# Patient Record
Sex: FEMALE | URBAN - METROPOLITAN AREA
[De-identification: names, ages, dates, MRNs, and addresses within clinical notes are randomized per-mention and may not be internally consistent; named-entity substitution may affect disease eponyms.]

---

## 2022-10-26 ENCOUNTER — APPOINTMENT (OUTPATIENT)
Dept: URBAN - METROPOLITAN AREA CLINIC 199 | Age: 87
Setting detail: DERMATOLOGY
End: 2022-10-26

## 2022-10-26 DIAGNOSIS — D17 BENIGN LIPOMATOUS NEOPLASM: ICD-10-CM

## 2022-10-26 DIAGNOSIS — L29.8 OTHER PRURITUS: ICD-10-CM

## 2022-10-26 DIAGNOSIS — R20.8 OTHER DISTURBANCES OF SKIN SENSATION: ICD-10-CM

## 2022-10-26 PROBLEM — D17.1 BENIGN LIPOMATOUS NEOPLASM OF SKIN AND SUBCUTANEOUS TISSUE OF TRUNK: Status: ACTIVE | Noted: 2022-10-26

## 2022-10-26 PROCEDURE — 99204 OFFICE O/P NEW MOD 45 MIN: CPT

## 2022-10-26 PROCEDURE — OTHER PRESCRIPTION: OTHER

## 2022-10-26 PROCEDURE — OTHER COUNSELING: OTHER

## 2022-10-26 RX ORDER — TRIAMCINOLONE ACETONIDE 1 MG/G
CREAM TOPICAL BID
Qty: 453.6 | Refills: 3 | Status: ERX | COMMUNITY
Start: 2022-10-26

## 2022-10-26 ASSESSMENT — LOCATION SIMPLE DESCRIPTION DERM
LOCATION SIMPLE: LEFT UPPER BACK
LOCATION SIMPLE: RIGHT UPPER BACK
LOCATION SIMPLE: RIGHT SHOULDER

## 2022-10-26 ASSESSMENT — LOCATION ZONE DERM
LOCATION ZONE: TRUNK
LOCATION ZONE: ARM

## 2022-10-26 ASSESSMENT — LOCATION DETAILED DESCRIPTION DERM
LOCATION DETAILED: RIGHT POSTERIOR SHOULDER
LOCATION DETAILED: LEFT SUPERIOR UPPER BACK
LOCATION DETAILED: RIGHT SUPERIOR MEDIAL UPPER BACK

## 2022-10-26 NOTE — PROCEDURE: COUNSELING
Patient Specific Counseling (Will Not Stick From Patient To Patient): \\nx 6 months. Pt states she can feel bugs biting her and has an  come to her house once a month. Daughter states this is taking over her life. States she just had wellness check 2 months ago without concern for malignancy. Also has annual labs done 2 months ago\\n-we will request CBC and CMP be faxed to use. I reviewed that I want to make sure her liver and kidneys look healthy. Additionally, want to make sure her blood counts are normal\\n-she is up to date with age appropriate cancer screening per pt and daughter\\n-she has diabetes which I wonder if playing a role in nerve related itch. Daughter states pt has neuropathy but she did not respond well to neuropathy meds in the past. We will ask PCP if gabapentin was the culprit. If gabapentin not the culprit, can consider starting very low dose\\n-advised benedryl once nightly to help with sleep. Pt states she wakes up often due to itching sensation. Reviewed that this will cause drowsiness\\n-pt states topical steroids per pcp had worked for itch in the past but she is bothered that itchy keeps returning. I reviewed that if itch is 2/2 eczema or nerve-related itch, can be a chronic issue\\n-start triam ointment BID PRN itch\\n-reviewed that if she sees a fresh bug bite or area of rash, to try not to scratch it and come in for biopsy as this can give her some peace of mind. She is very concerned she has bug bites despite having exterminators come to her house monthly\\n-rtc 4-6 weeks
Detail Level: Detailed

## 2023-03-30 ENCOUNTER — APPOINTMENT (OUTPATIENT)
Dept: URBAN - METROPOLITAN AREA CLINIC 199 | Age: 88
Setting detail: DERMATOLOGY
End: 2023-03-30

## 2023-03-30 DIAGNOSIS — R20.8 OTHER DISTURBANCES OF SKIN SENSATION: ICD-10-CM

## 2023-03-30 DIAGNOSIS — L29.8 OTHER PRURITUS: ICD-10-CM

## 2023-03-30 DIAGNOSIS — R21 RASH AND OTHER NONSPECIFIC SKIN ERUPTION: ICD-10-CM

## 2023-03-30 PROCEDURE — 11102 TANGNTL BX SKIN SINGLE LES: CPT

## 2023-03-30 PROCEDURE — 99214 OFFICE O/P EST MOD 30 MIN: CPT | Mod: 25

## 2023-03-30 PROCEDURE — OTHER PRESCRIPTION: OTHER

## 2023-03-30 PROCEDURE — OTHER ORDER TESTS: OTHER

## 2023-03-30 PROCEDURE — OTHER COUNSELING: OTHER

## 2023-03-30 PROCEDURE — OTHER BIOPSY BY SHAVE METHOD: OTHER

## 2023-03-30 RX ORDER — TRIAMCINOLONE ACETONIDE 1 MG/G
CREAM TOPICAL BID
Qty: 453.6 | Refills: 3 | Status: ERX

## 2023-03-30 ASSESSMENT — LOCATION SIMPLE DESCRIPTION DERM
LOCATION SIMPLE: RIGHT UPPER BACK
LOCATION SIMPLE: RIGHT SHOULDER

## 2023-03-30 ASSESSMENT — LOCATION DETAILED DESCRIPTION DERM
LOCATION DETAILED: RIGHT SUPERIOR MEDIAL UPPER BACK
LOCATION DETAILED: RIGHT POSTERIOR SHOULDER
LOCATION DETAILED: RIGHT SUPERIOR UPPER BACK

## 2023-03-30 ASSESSMENT — LOCATION ZONE DERM
LOCATION ZONE: TRUNK
LOCATION ZONE: ARM

## 2023-03-30 NOTE — PROCEDURE: BIOPSY BY SHAVE METHOD
Path Notes (To The Dermatopathologist): Pt with baseline DM and mild CKD w/ 3+ years of itch on arms, legs, back. Believes she has bed bugs despite numerous evals by . Erosions without rash on exam. ? Neurotic excoriations/DOP. R/O early immunobullous disorder, atopic derm

## 2023-03-30 NOTE — PROCEDURE: COUNSELING
Patient Specific Counseling (Will Not Stick From Patient To Patient): x at least a year. States this could have been happening for 3 years now. Pt states she can feel bugs biting her at night and has an  come to her house once a month. States that she follows with PCP regularly and is up to date with age appropriate cancer screenings. We had requested labs from PCP 6 months ago and labs at that time showed mild CKD which the PCP had been monitoring and was stable. Has not done baseline labs this year yet.\\nPt states triamcinolone was very helpful and sometimes she thinks the bug bites are starting to go away but then they reappear. She thinks the bugs have a sleep cycle and she feels them biting her specifically at night. She denies ever seeing blisters (screening for autoimmune bullous disorder)\\n-offered biopsy today to see if there is any evidence of bug bite under microscope. She and daughter are very interested\\n-cbc, cmp today. TSH and thyroid followed by endocrine (she has thyroid removed, is on synthroid)\\n-she is up to date with age appropriate cancer screening per pt and daughter\\n-she has diabetes which I wonder if playing a role in nerve related itch. Also wonder if she has itch from CKD\\n-reviewed options such as trying different topicals such as eucrisa, protopic, n-acetylcysteine, dupixent. She elects to stick with triam today\\n-will await biopsy and lab results and base management off these\\n-rtc 2-3 months
Detail Level: Detailed